# Patient Record
Sex: MALE | Race: WHITE | NOT HISPANIC OR LATINO | URBAN - METROPOLITAN AREA
[De-identification: names, ages, dates, MRNs, and addresses within clinical notes are randomized per-mention and may not be internally consistent; named-entity substitution may affect disease eponyms.]

---
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
## 2024-03-19 ENCOUNTER — OFFICE (OUTPATIENT)
Dept: URBAN - METROPOLITAN AREA CLINIC 112 | Facility: CLINIC | Age: 24
Setting detail: OPHTHALMOLOGY
End: 2024-03-19
Payer: COMMERCIAL

## 2024-03-19 DIAGNOSIS — H16.223: ICD-10-CM

## 2024-03-19 DIAGNOSIS — H17.9: ICD-10-CM

## 2024-03-19 PROBLEM — H16.222 DRY EYE SYNDROME K SICCA; RIGHT EYE, LEFT EYE, BOTH EYES: Status: ACTIVE | Noted: 2024-03-19

## 2024-03-19 PROBLEM — H16.221 DRY EYE SYNDROME K SICCA; RIGHT EYE, LEFT EYE, BOTH EYES: Status: ACTIVE | Noted: 2024-03-19

## 2024-03-19 PROCEDURE — 83861 MICROFLUID ANALY TEARS: CPT | Mod: QW | Performed by: OPHTHALMOLOGY

## 2024-03-19 PROCEDURE — 92012 INTRM OPH EXAM EST PATIENT: CPT | Performed by: OPHTHALMOLOGY

## 2024-03-19 PROCEDURE — 92025 CPTRIZED CORNEAL TOPOGRAPHY: CPT | Performed by: OPHTHALMOLOGY

## 2024-03-19 ASSESSMENT — SPHEQUIV_DERIVED
OS_SPHEQUIV: 0.125
OS_SPHEQUIV: -2.625
OD_SPHEQUIV: 0.25
OD_SPHEQUIV: -3.375

## 2024-03-19 ASSESSMENT — REFRACTION_CURRENTRX
OD_CYLINDER: 0.00
OS_VPRISM_DIRECTION: SV
OS_AXIS: 155
OD_AXIS: 000
OD_VPRISM_DIRECTION: SV
OS_SPHERE: -3.25
OS_OVR_VA: 20/
OS_CYLINDER: -0.25
OD_SPHERE: -3.75
OD_OVR_VA: 20/

## 2024-03-19 ASSESSMENT — REFRACTION_MANIFEST
OS_VA1: 20/20
OD_AXIS: 105
OD_CYLINDER: -0.50
OS_VA1: 20/20
OD_AXIS: 080
OD_VA1: 20/20
OD_SPHERE: +0.50
OS_AXIS: 165
OS_CYLINDER: -0.25
OS_AXIS: 105
OD_SPHERE: -3.25
OS_SPHERE: -2.50
OS_SPHERE: +0.25
OD_CYLINDER: -0.25
OS_CYLINDER: -0.25
OD_VA1: 20/20